# Patient Record
Sex: MALE | Race: BLACK OR AFRICAN AMERICAN | Employment: PART TIME | URBAN - METROPOLITAN AREA
[De-identification: names, ages, dates, MRNs, and addresses within clinical notes are randomized per-mention and may not be internally consistent; named-entity substitution may affect disease eponyms.]

---
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
## 2022-06-07 PROBLEM — H57.89 CONTACT LENS STUCK: Status: ACTIVE | Noted: 2022-06-07

## 2022-06-07 PROBLEM — T15.92XA FOREIGN BODY OF LEFT EYE: Status: ACTIVE | Noted: 2022-06-07
